# Patient Record
Sex: FEMALE | Race: WHITE
[De-identification: names, ages, dates, MRNs, and addresses within clinical notes are randomized per-mention and may not be internally consistent; named-entity substitution may affect disease eponyms.]

---

## 2018-07-11 ENCOUNTER — HOSPITAL ENCOUNTER (OUTPATIENT)
Age: 23
Discharge: HOME | End: 2018-07-11

## 2018-07-11 ENCOUNTER — HOSPITAL ENCOUNTER (OUTPATIENT)
Dept: HOSPITAL 91 - OBT | Age: 23
Discharge: HOME | End: 2018-07-11
Payer: COMMERCIAL

## 2018-07-11 DIAGNOSIS — Z3A.28: ICD-10-CM

## 2018-07-11 LAB
ADD UMIC: NO
FETAL FIBRONECTIN: NEGATIVE
UR ASCORBIC ACID: NEGATIVE MG/DL
UR BILIRUBIN (DIP): NEGATIVE MG/DL
UR BLOOD (DIP): NEGATIVE MG/DL
UR CLARITY: CLEAR
UR COLOR: YELLOW
UR GLUCOSE (DIP): NEGATIVE MG/DL
UR KETONES (DIP): NEGATIVE MG/DL
UR LEUKOCYTE ESTERASE (DIP): NEGATIVE LEU/UL
UR NITRITE (DIP): NEGATIVE MG/DL
UR PH (DIP): 6 (ref 5–9)
UR SPECIFIC GRAVITY (DIP): 1.02 (ref 1–1.03)
UR TOTAL PROTEIN (DIP): NEGATIVE MG/DL
UR UROBILINOGEN (DIP): NEGATIVE MG/DL

## 2018-07-11 PROCEDURE — 81003 URINALYSIS AUTO W/O SCOPE: CPT

## 2018-07-11 PROCEDURE — 82731 ASSAY OF FETAL FIBRONECTIN: CPT

## 2018-07-11 PROCEDURE — 76817 TRANSVAGINAL US OBSTETRIC: CPT

## 2018-09-18 ENCOUNTER — HOSPITAL ENCOUNTER (OUTPATIENT)
Dept: HOSPITAL 91 - OBT | Age: 23
Discharge: HOME | End: 2018-09-18
Payer: COMMERCIAL

## 2018-09-18 ENCOUNTER — HOSPITAL ENCOUNTER (OUTPATIENT)
Age: 23
Discharge: HOME | End: 2018-09-18

## 2018-09-18 DIAGNOSIS — Z3A.38: ICD-10-CM

## 2018-09-18 DIAGNOSIS — O36.8130: Primary | ICD-10-CM

## 2018-09-18 PROCEDURE — 76818 FETAL BIOPHYS PROFILE W/NST: CPT

## 2018-09-18 PROCEDURE — 76815 OB US LIMITED FETUS(S): CPT

## 2018-09-27 ENCOUNTER — HOSPITAL ENCOUNTER (INPATIENT)
Age: 23
LOS: 3 days | Discharge: HOME | End: 2018-09-30

## 2018-09-27 ENCOUNTER — HOSPITAL ENCOUNTER (INPATIENT)
Dept: HOSPITAL 91 - L-D | Age: 23
LOS: 3 days | Discharge: HOME | End: 2018-09-30
Payer: COMMERCIAL

## 2018-09-27 DIAGNOSIS — O34.211: Primary | ICD-10-CM

## 2018-09-27 DIAGNOSIS — Z3A.39: ICD-10-CM

## 2018-09-27 LAB
ADD MAN DIFF?: NO
BASOPHIL #: 0.1 10^3/UL (ref 0–0.1)
BASOPHILS %: 0.5 % (ref 0–2)
EOSINOPHILS #: 0.1 10^3/UL (ref 0–0.5)
EOSINOPHILS %: 0.6 % (ref 0–7)
HEMATOCRIT: 37 % (ref 37–47)
HEMOGLOBIN: 12.5 G/DL (ref 12–16)
IMMATURE GRANS #M: 0.06 10^3/UL (ref 0–0.03)
IMMATURE GRANS % (M): 0.6 % (ref 0–0.43)
INR: 0.89
LYMPHOCYTES #: 2.8 10^3/UL (ref 0.8–2.9)
LYMPHOCYTES %: 30.1 % (ref 15–51)
MEAN CORPUSCULAR HEMOGLOBIN: 30.6 PG (ref 29–33)
MEAN CORPUSCULAR HGB CONC: 33.8 G/DL (ref 32–37)
MEAN CORPUSCULAR VOLUME: 90.5 FL (ref 82–101)
MEAN PLATELET VOLUME: 10.9 FL (ref 7.4–10.4)
MONOCYTE #: 0.4 10^3/UL (ref 0.3–0.9)
MONOCYTES %: 4.6 % (ref 0–11)
NEUTROPHIL #: 6 10^3/UL (ref 1.6–7.5)
NEUTROPHILS %: 63.6 % (ref 39–77)
NUCLEATED RED BLOOD CELLS #: 0 10^3/UL (ref 0–0)
NUCLEATED RED BLOOD CELLS%: 0 /100WBC (ref 0–0)
PARTIAL THROMBOPLASTIN TIME: 24.1 SEC (ref 23–35)
PLATELET COUNT: 232 10^3/UL (ref 140–415)
PROTIME: 12.1 SEC (ref 11.9–14.9)
PT RATIO: 0.9
RAPID PLASMA REAGIN: NONREACTIVE
RED BLOOD COUNT: 4.09 10^6/UL (ref 4.2–5.4)
RED CELL DISTRIBUTION WIDTH: 12.4 % (ref 11.5–14.5)
WHITE BLOOD COUNT: 9.4 10^3/UL (ref 4.8–10.8)

## 2018-09-27 PROCEDURE — 86900 BLOOD TYPING SEROLOGIC ABO: CPT

## 2018-09-27 PROCEDURE — 85610 PROTHROMBIN TIME: CPT

## 2018-09-27 PROCEDURE — 86850 RBC ANTIBODY SCREEN: CPT

## 2018-09-27 PROCEDURE — 86901 BLOOD TYPING SEROLOGIC RH(D): CPT

## 2018-09-27 PROCEDURE — 90715 TDAP VACCINE 7 YRS/> IM: CPT

## 2018-09-27 PROCEDURE — 80048 BASIC METABOLIC PNL TOTAL CA: CPT

## 2018-09-27 PROCEDURE — 86592 SYPHILIS TEST NON-TREP QUAL: CPT

## 2018-09-27 PROCEDURE — 85025 COMPLETE CBC W/AUTO DIFF WBC: CPT

## 2018-09-27 PROCEDURE — 85730 THROMBOPLASTIN TIME PARTIAL: CPT

## 2018-09-27 RX ADMIN — PYRIDOXINE HYDROCHLORIDE 1 MLS/HR: 100 INJECTION, SOLUTION INTRAMUSCULAR; INTRAVENOUS at 10:20

## 2018-09-27 RX ADMIN — SODIUM CITRATE AND CITRIC ACID MONOHYDRATE 1 ML: 500; 334 SOLUTION ORAL at 17:31

## 2018-09-27 RX ADMIN — ONDANSETRON HYDROCHLORIDE 1 MG: 2 INJECTION, SOLUTION INTRAMUSCULAR; INTRAVENOUS at 17:31

## 2018-09-27 RX ADMIN — AMPICILLIN 1 MLS/HR: 1 INJECTION, POWDER, FOR SOLUTION INTRAMUSCULAR; INTRAVENOUS at 17:30

## 2018-09-27 RX ADMIN — AMPICILLIN 1 MLS/HR: 1 INJECTION, POWDER, FOR SOLUTION INTRAMUSCULAR; INTRAVENOUS at 21:30

## 2018-09-27 RX ADMIN — AMPICILLIN 1 MLS/HR: 2 INJECTION, POWDER, FOR SOLUTION INTRAVENOUS at 10:20

## 2018-09-27 RX ADMIN — Medication 1 MLS/HR: at 19:16

## 2018-09-27 RX ADMIN — DOCUSATE SODIUM AND SENNOSIDES 1 TAB: 8.6; 5 TABLET, FILM COATED ORAL at 21:00

## 2018-09-27 RX ADMIN — AMPICILLIN 1 MLS/HR: 1 INJECTION, POWDER, FOR SOLUTION INTRAMUSCULAR; INTRAVENOUS at 14:56

## 2018-09-27 RX ADMIN — PYRIDOXINE HYDROCHLORIDE 1 MLS/HR: 100 INJECTION, SOLUTION INTRAMUSCULAR; INTRAVENOUS at 17:05

## 2018-09-27 RX ADMIN — Medication 1 MLS/HR: at 23:15

## 2018-09-28 LAB
ADD MAN DIFF?: NO
ANION GAP: 8 (ref 8–16)
BASOPHIL #: 0 10^3/UL (ref 0–0.1)
BASOPHILS %: 0.3 % (ref 0–2)
BLOOD UREA NITROGEN: 6 MG/DL (ref 7–20)
CALCIUM: 8.5 MG/DL (ref 8.4–10.2)
CARBON DIOXIDE: 24 MMOL/L (ref 21–31)
CHLORIDE: 105 MMOL/L (ref 97–110)
CREATININE: 0.48 MG/DL (ref 0.44–1)
EOSINOPHILS #: 0 10^3/UL (ref 0–0.5)
EOSINOPHILS %: 0.2 % (ref 0–7)
GLUCOSE: 92 MG/DL (ref 70–220)
HEMATOCRIT: 31.4 % (ref 37–47)
HEMOGLOBIN: 10.5 G/DL (ref 12–16)
IMMATURE GRANS #M: 0.07 10^3/UL (ref 0–0.03)
IMMATURE GRANS % (M): 0.5 % (ref 0–0.43)
LYMPHOCYTES #: 2.9 10^3/UL (ref 0.8–2.9)
LYMPHOCYTES %: 19.9 % (ref 15–51)
MEAN CORPUSCULAR HEMOGLOBIN: 30.6 PG (ref 29–33)
MEAN CORPUSCULAR HGB CONC: 33.4 G/DL (ref 32–37)
MEAN CORPUSCULAR VOLUME: 91.5 FL (ref 82–101)
MEAN PLATELET VOLUME: 10.8 FL (ref 7.4–10.4)
MONOCYTE #: 0.7 10^3/UL (ref 0.3–0.9)
MONOCYTES %: 4.7 % (ref 0–11)
NEUTROPHIL #: 10.7 10^3/UL (ref 1.6–7.5)
NEUTROPHILS %: 74.4 % (ref 39–77)
NUCLEATED RED BLOOD CELLS #: 0 10^3/UL (ref 0–0)
NUCLEATED RED BLOOD CELLS%: 0 /100WBC (ref 0–0)
PLATELET COUNT: 188 10^3/UL (ref 140–415)
POTASSIUM: 4 MMOL/L (ref 3.5–5.1)
RED BLOOD COUNT: 3.43 10^6/UL (ref 4.2–5.4)
RED CELL DISTRIBUTION WIDTH: 12.4 % (ref 11.5–14.5)
SODIUM: 133 MMOL/L (ref 135–144)
WHITE BLOOD COUNT: 14.4 10^3/UL (ref 4.8–10.8)

## 2018-09-28 RX ADMIN — KETOROLAC TROMETHAMINE 1 MG: 30 INJECTION, SOLUTION INTRAMUSCULAR at 01:50

## 2018-09-28 RX ADMIN — KETOROLAC TROMETHAMINE 1 MG: 30 INJECTION, SOLUTION INTRAMUSCULAR at 07:50

## 2018-09-28 RX ADMIN — PYRIDOXINE HYDROCHLORIDE 1 MLS/HR: 100 INJECTION, SOLUTION INTRAMUSCULAR; INTRAVENOUS at 10:38

## 2018-09-28 RX ADMIN — AMPICILLIN 1 MLS/HR: 1 INJECTION, POWDER, FOR SOLUTION INTRAMUSCULAR; INTRAVENOUS at 05:30

## 2018-09-28 RX ADMIN — Medication 1 APPLIC: at 06:03

## 2018-09-28 RX ADMIN — Medication 1 MG: at 21:00

## 2018-09-28 RX ADMIN — AMPICILLIN 1 MLS/HR: 1 INJECTION, POWDER, FOR SOLUTION INTRAMUSCULAR; INTRAVENOUS at 01:30

## 2018-09-28 RX ADMIN — DOCUSATE SODIUM AND SENNOSIDES 1 TAB: 8.6; 5 TABLET, FILM COATED ORAL at 09:00

## 2018-09-28 RX ADMIN — KETOROLAC TROMETHAMINE 1 MG: 30 INJECTION, SOLUTION INTRAMUSCULAR at 13:33

## 2018-09-28 RX ADMIN — MAGNESIUM HYDROXIDE 1 ML: 400 SUSPENSION ORAL at 21:12

## 2018-09-28 RX ADMIN — IBUPROFEN 1 MG: 800 TABLET, FILM COATED ORAL at 19:36

## 2018-09-28 RX ADMIN — AMPICILLIN 1 MLS/HR: 1 INJECTION, POWDER, FOR SOLUTION INTRAMUSCULAR; INTRAVENOUS at 21:30

## 2018-09-28 RX ADMIN — DOCUSATE SODIUM AND SENNOSIDES 1 TAB: 8.6; 5 TABLET, FILM COATED ORAL at 21:12

## 2018-09-28 RX ADMIN — PYRIDOXINE HYDROCHLORIDE 1 MLS/HR: 100 INJECTION, SOLUTION INTRAMUSCULAR; INTRAVENOUS at 19:30

## 2018-09-29 RX ADMIN — IBUPROFEN 1 MG: 800 TABLET, FILM COATED ORAL at 21:13

## 2018-09-29 RX ADMIN — PYRIDOXINE HYDROCHLORIDE 1 MLS/HR: 100 INJECTION, SOLUTION INTRAMUSCULAR; INTRAVENOUS at 02:59

## 2018-09-29 RX ADMIN — DOCUSATE SODIUM AND SENNOSIDES 1 TAB: 8.6; 5 TABLET, FILM COATED ORAL at 21:13

## 2018-09-29 RX ADMIN — IBUPROFEN 1 MG: 800 TABLET, FILM COATED ORAL at 13:03

## 2018-09-29 RX ADMIN — MAGNESIUM HYDROXIDE 1 ML: 400 SUSPENSION ORAL at 09:51

## 2018-09-29 RX ADMIN — AMPICILLIN 1 MLS/HR: 1 INJECTION, POWDER, FOR SOLUTION INTRAMUSCULAR; INTRAVENOUS at 01:30

## 2018-09-29 RX ADMIN — Medication 1 MG: at 09:52

## 2018-09-29 RX ADMIN — IBUPROFEN 1 MG: 800 TABLET, FILM COATED ORAL at 04:27

## 2018-09-29 RX ADMIN — DOCUSATE SODIUM AND SENNOSIDES 1 TAB: 8.6; 5 TABLET, FILM COATED ORAL at 09:51

## 2018-09-29 RX ADMIN — AMPICILLIN 1 MLS/HR: 1 INJECTION, POWDER, FOR SOLUTION INTRAMUSCULAR; INTRAVENOUS at 05:30

## 2018-09-30 LAB
ADD MAN DIFF?: NO
BASOPHIL #: 0 10^3/UL (ref 0–0.1)
BASOPHILS %: 0.3 % (ref 0–2)
EOSINOPHILS #: 0.1 10^3/UL (ref 0–0.5)
EOSINOPHILS %: 0.7 % (ref 0–7)
HEMATOCRIT: 32.4 % (ref 37–47)
HEMOGLOBIN: 10.5 G/DL (ref 12–16)
IMMATURE GRANS #M: 0.07 10^3/UL (ref 0–0.03)
IMMATURE GRANS % (M): 0.6 % (ref 0–0.43)
LYMPHOCYTES #: 3.8 10^3/UL (ref 0.8–2.9)
LYMPHOCYTES %: 32 % (ref 15–51)
MEAN CORPUSCULAR HEMOGLOBIN: 29.9 PG (ref 29–33)
MEAN CORPUSCULAR HGB CONC: 32.4 G/DL (ref 32–37)
MEAN CORPUSCULAR VOLUME: 92.3 FL (ref 82–101)
MEAN PLATELET VOLUME: 10.4 FL (ref 7.4–10.4)
MONOCYTE #: 0.7 10^3/UL (ref 0.3–0.9)
MONOCYTES %: 5.9 % (ref 0–11)
NEUTROPHIL #: 7.2 10^3/UL (ref 1.6–7.5)
NEUTROPHILS %: 60.5 % (ref 39–77)
NUCLEATED RED BLOOD CELLS #: 0 10^3/UL (ref 0–0)
NUCLEATED RED BLOOD CELLS%: 0 /100WBC (ref 0–0)
PLATELET COUNT: 212 10^3/UL (ref 140–415)
RED BLOOD COUNT: 3.51 10^6/UL (ref 4.2–5.4)
RED CELL DISTRIBUTION WIDTH: 12.7 % (ref 11.5–14.5)
WHITE BLOOD COUNT: 11.9 10^3/UL (ref 4.8–10.8)

## 2018-09-30 RX ADMIN — DOCUSATE SODIUM AND SENNOSIDES 1 TAB: 8.6; 5 TABLET, FILM COATED ORAL at 09:07

## 2018-09-30 RX ADMIN — CLOSTRIDIUM TETANI TOXOID ANTIGEN (FORMALDEHYDE INACTIVATED), CORYNEBACTERIUM DIPHTHERIAE TOXOID ANTIGEN (FORMALDEHYDE INACTIVATED), BORDETELLA PERTUSSIS TOXOID ANTIGEN (GLUTARALDEHYDE INACTIVATED), BORDETELLA PERTUSSIS FILAMENTOUS HEMAGGLUTININ ANTIGEN (FORMALDEHYDE INACTIVATED), BORDETELLA PERTUSSIS PERTACTIN ANTIGEN, AND BORDETELLA PERTUSSIS FIMBRIAE 2/3 ANTIGEN 1 ML: 5; 2; 2.5; 5; 3; 5 INJECTION, SUSPENSION INTRAMUSCULAR at 11:11

## 2018-09-30 RX ADMIN — IBUPROFEN 1 MG: 800 TABLET, FILM COATED ORAL at 12:47

## 2018-09-30 RX ADMIN — IBUPROFEN 1 MG: 800 TABLET, FILM COATED ORAL at 05:13

## 2019-06-11 ENCOUNTER — HOSPITAL ENCOUNTER (INPATIENT)
Dept: HOSPITAL 10 - OBT | Age: 24
LOS: 6 days | Discharge: HOME | DRG: 833 | End: 2019-06-17
Attending: OBSTETRICS & GYNECOLOGY | Admitting: OBSTETRICS & GYNECOLOGY
Payer: COMMERCIAL

## 2019-06-11 ENCOUNTER — HOSPITAL ENCOUNTER (INPATIENT)
Dept: HOSPITAL 91 - OBT | Age: 24
LOS: 6 days | Discharge: HOME | DRG: 833 | End: 2019-06-17
Payer: COMMERCIAL

## 2019-06-11 VITALS
HEIGHT: 60 IN | WEIGHT: 185.19 LBS | HEIGHT: 60 IN | BODY MASS INDEX: 36.36 KG/M2 | WEIGHT: 185.19 LBS | BODY MASS INDEX: 36.36 KG/M2

## 2019-06-11 VITALS — RESPIRATION RATE: 17 BRPM | HEART RATE: 110 BPM | SYSTOLIC BLOOD PRESSURE: 112 MMHG | DIASTOLIC BLOOD PRESSURE: 68 MMHG

## 2019-06-11 DIAGNOSIS — O23.02: Primary | ICD-10-CM

## 2019-06-11 DIAGNOSIS — O99.212: ICD-10-CM

## 2019-06-11 DIAGNOSIS — Z3A.24: ICD-10-CM

## 2019-06-11 LAB
ADD MAN DIFF?: NO
ADD UMIC: YES
ALANINE AMINOTRANSFERASE: 20 IU/L (ref 13–69)
ALBUMIN/GLOBULIN RATIO: 1.15
ALBUMIN: 3.8 G/DL (ref 3.3–4.9)
ALKALINE PHOSPHATASE: 83 IU/L (ref 42–121)
ANION GAP: 10 (ref 5–13)
ASPARTATE AMINO TRANSFERASE: 26 IU/L (ref 15–46)
BASOPHIL #: 0 10^3/UL (ref 0–0.1)
BASOPHILS %: 0.3 % (ref 0–2)
BILIRUBIN,DIRECT: 0 MG/DL (ref 0–0.2)
BILIRUBIN,TOTAL: 0.5 MG/DL (ref 0.2–1.3)
BLOOD UREA NITROGEN: 4 MG/DL (ref 7–20)
CALCIUM: 9.3 MG/DL (ref 8.4–10.2)
CARBON DIOXIDE: 20 MMOL/L (ref 21–31)
CHLORIDE: 106 MMOL/L (ref 97–110)
CREATININE: 0.5 MG/DL (ref 0.44–1)
EOSINOPHILS #: 0 10^3/UL (ref 0–0.5)
EOSINOPHILS %: 0.1 % (ref 0–7)
GLOBULIN: 3.3 G/DL (ref 1.3–3.2)
GLUCOSE: 94 MG/DL (ref 70–220)
HEMATOCRIT: 32.7 % (ref 37–47)
HEMOGLOBIN: 10.9 G/DL (ref 12–16)
IMMATURE GRANS #M: 0.07 10^3/UL (ref 0–0.03)
IMMATURE GRANS % (M): 0.6 % (ref 0–0.43)
LYMPHOCYTES #: 1.5 10^3/UL (ref 0.8–2.9)
LYMPHOCYTES %: 12.4 % (ref 15–51)
MEAN CORPUSCULAR HEMOGLOBIN: 31.7 PG (ref 29–33)
MEAN CORPUSCULAR HGB CONC: 33.3 G/DL (ref 32–37)
MEAN CORPUSCULAR VOLUME: 95.1 FL (ref 82–101)
MEAN PLATELET VOLUME: 10.1 FL (ref 7.4–10.4)
MONOCYTE #: 0.6 10^3/UL (ref 0.3–0.9)
MONOCYTES %: 5 % (ref 0–11)
NEUTROPHIL #: 9.6 10^3/UL (ref 1.6–7.5)
NEUTROPHILS %: 81.6 % (ref 39–77)
NUCLEATED RED BLOOD CELLS #: 0 10^3/UL (ref 0–0)
NUCLEATED RED BLOOD CELLS%: 0 /100WBC (ref 0–0)
PLATELET COUNT: 267 10^3/UL (ref 140–415)
POTASSIUM: 3.4 MMOL/L (ref 3.5–5.1)
RED BLOOD COUNT: 3.44 10^6/UL (ref 4.2–5.4)
RED CELL DISTRIBUTION WIDTH: 12.1 % (ref 11.5–14.5)
SODIUM: 136 MMOL/L (ref 135–144)
TOTAL PROTEIN: 7.1 G/DL (ref 6.1–8.1)
UR ASCORBIC ACID: NEGATIVE MG/DL
UR BILIRUBIN (DIP): NEGATIVE MG/DL
UR BLOOD (DIP): NEGATIVE MG/DL
UR CLARITY: (no result)
UR COLOR: (no result)
UR GLUCOSE (DIP): NEGATIVE MG/DL
UR KETONES (DIP): (no result) MG/DL
UR LEUKOCYTE ESTERASE (DIP): (no result) LEU/UL
UR MUCUS: (no result) /HPF
UR NITRITE (DIP): NEGATIVE MG/DL
UR PH (DIP): 6 (ref 5–9)
UR RBC: 2 /HPF (ref 0–5)
UR SPECIFIC GRAVITY (DIP): 1.02 (ref 1–1.03)
UR SQUAMOUS EPITHELIAL CELL: (no result) /HPF
UR TOTAL PROTEIN (DIP): NEGATIVE MG/DL
UR UROBILINOGEN (DIP): NEGATIVE MG/DL
UR WBC: 9 /HPF (ref 0–5)
WHITE BLOOD COUNT: 11.8 10^3/UL (ref 4.8–10.8)

## 2019-06-11 PROCEDURE — 87400 INFLUENZA A/B EACH AG IA: CPT

## 2019-06-11 PROCEDURE — 87086 URINE CULTURE/COLONY COUNT: CPT

## 2019-06-11 PROCEDURE — 85025 COMPLETE CBC W/AUTO DIFF WBC: CPT

## 2019-06-11 PROCEDURE — 81003 URINALYSIS AUTO W/O SCOPE: CPT

## 2019-06-11 PROCEDURE — 76817 TRANSVAGINAL US OBSTETRIC: CPT

## 2019-06-11 PROCEDURE — 87040 BLOOD CULTURE FOR BACTERIA: CPT

## 2019-06-11 PROCEDURE — 81001 URINALYSIS AUTO W/SCOPE: CPT

## 2019-06-11 PROCEDURE — 76815 OB US LIMITED FETUS(S): CPT

## 2019-06-11 PROCEDURE — 76775 US EXAM ABDO BACK WALL LIM: CPT

## 2019-06-11 PROCEDURE — 80053 COMPREHEN METABOLIC PANEL: CPT

## 2019-06-11 PROCEDURE — G0463 HOSPITAL OUTPT CLINIC VISIT: HCPCS

## 2019-06-11 PROCEDURE — 76818 FETAL BIOPHYS PROFILE W/NST: CPT

## 2019-06-11 RX ADMIN — PYRIDOXINE HYDROCHLORIDE 1 MLS/HR: 100 INJECTION, SOLUTION INTRAMUSCULAR; INTRAVENOUS at 23:59

## 2019-06-11 RX ADMIN — PYRIDOXINE HYDROCHLORIDE 1 MLS/HR: 100 INJECTION, SOLUTION INTRAMUSCULAR; INTRAVENOUS at 23:38

## 2019-06-11 RX ADMIN — ACETAMINOPHEN 1 MG: 325 TABLET, FILM COATED ORAL at 22:10

## 2019-06-11 RX ADMIN — PYRIDOXINE HYDROCHLORIDE SCH MLS/HR: 100 INJECTION, SOLUTION INTRAMUSCULAR; INTRAVENOUS at 23:59

## 2019-06-12 RX ADMIN — OSELTAMIVIR PHOSPHATE 1 MG: 75 CAPSULE ORAL at 02:44

## 2019-06-12 RX ADMIN — PYRIDOXINE HYDROCHLORIDE 1 MLS/HR: 100 INJECTION, SOLUTION INTRAMUSCULAR; INTRAVENOUS at 20:40

## 2019-06-12 RX ADMIN — OSELTAMIVIR PHOSPHATE 1 MG: 75 CAPSULE ORAL at 11:14

## 2019-06-12 RX ADMIN — OSELTAMIVIR PHOSPHATE SCH MG: 75 CAPSULE ORAL at 02:44

## 2019-06-12 RX ADMIN — CEFTRIAXONE SCH MLS/HR: 1 INJECTION, SOLUTION INTRAVENOUS at 01:37

## 2019-06-12 RX ADMIN — PYRIDOXINE HYDROCHLORIDE 1 MLS/HR: 100 INJECTION, SOLUTION INTRAMUSCULAR; INTRAVENOUS at 23:59

## 2019-06-12 RX ADMIN — FOLIC ACID SCH MLS/HR: 5 INJECTION, SOLUTION INTRAMUSCULAR; INTRAVENOUS; SUBCUTANEOUS at 01:37

## 2019-06-12 RX ADMIN — THIAMINE HYDROCHLORIDE 1 MLS/HR: 100 INJECTION, SOLUTION INTRAMUSCULAR; INTRAVENOUS at 16:30

## 2019-06-12 RX ADMIN — FOLIC ACID SCH MLS/HR: 5 INJECTION, SOLUTION INTRAMUSCULAR; INTRAVENOUS; SUBCUTANEOUS at 16:30

## 2019-06-12 RX ADMIN — ACETAMINOPHEN 1 MG: 325 TABLET, FILM COATED ORAL at 23:21

## 2019-06-12 RX ADMIN — ACETAMINOPHEN 1 MG: 325 TABLET, FILM COATED ORAL at 04:30

## 2019-06-12 RX ADMIN — FOLIC ACID SCH MLS/HR: 5 INJECTION, SOLUTION INTRAMUSCULAR; INTRAVENOUS; SUBCUTANEOUS at 08:30

## 2019-06-12 RX ADMIN — PYRIDOXINE HYDROCHLORIDE SCH MLS/HR: 100 INJECTION, SOLUTION INTRAMUSCULAR; INTRAVENOUS at 23:59

## 2019-06-12 RX ADMIN — OSELTAMIVIR PHOSPHATE 1 MG: 75 CAPSULE ORAL at 21:10

## 2019-06-12 RX ADMIN — OSELTAMIVIR PHOSPHATE SCH MG: 75 CAPSULE ORAL at 11:14

## 2019-06-12 RX ADMIN — CEFTRIAXONE 1 MLS/HR: 1 INJECTION, SOLUTION INTRAVENOUS at 01:37

## 2019-06-12 RX ADMIN — THIAMINE HYDROCHLORIDE 1 MLS/HR: 100 INJECTION, SOLUTION INTRAMUSCULAR; INTRAVENOUS at 01:37

## 2019-06-12 RX ADMIN — ACETAMINOPHEN 1 MG: 325 TABLET, FILM COATED ORAL at 09:44

## 2019-06-12 RX ADMIN — Medication SCH TAB: at 09:38

## 2019-06-12 RX ADMIN — PYRIDOXINE HYDROCHLORIDE SCH MLS/HR: 100 INJECTION, SOLUTION INTRAMUSCULAR; INTRAVENOUS at 20:40

## 2019-06-12 RX ADMIN — THIAMINE HYDROCHLORIDE 1 MLS/HR: 100 INJECTION, SOLUTION INTRAMUSCULAR; INTRAVENOUS at 08:30

## 2019-06-12 RX ADMIN — OSELTAMIVIR PHOSPHATE SCH MG: 75 CAPSULE ORAL at 21:10

## 2019-06-12 RX ADMIN — ACETAMINOPHEN 1 MG: 325 TABLET, FILM COATED ORAL at 17:36

## 2019-06-12 RX ADMIN — Medication 1 TAB: at 09:38

## 2019-06-12 NOTE — HP
Date/Time of Note


Date/Time of Note


DATE: 19 


TIME: 01:00





OB - History


Hx of Present Pregnancy


Free Text/Dictation


Patient is a 23-year-old  3 para 2 at 24 weeks of gestation with 


estimated date of delivery 2019


Patient just arrived from [facility] and immediately came here to the hospital 


directly from the airport


She reports of having urinary tract infection in [facility] which she was rece


iving IV antibiotic treatments daily


Patient also reports of possible mosquito bites while in [facility]


She is complaining of fevers, total body aches and flulike symptoms and back 


pain





Patient reports positive fetal movement, denies vaginal bleeding and leaking 


fluid, denies uterine contractions


Estimated Due Date:  Oct 1, 2019


:  3


Para:  2


Prenatal Care:  Limited Care





Past Family/Social History


*


Past medical and past surgical history are noncontributory to this admission





OB  Admission Exam


Vital Signs


Vital Signs





Vital Signs








                          VS - Last 72 Hours, by Label


  Date      Temp   Pulse  Resp  B/P (MAP)   Pulse Ox  O2         O2 Flow    FiO2


Time                                                  Delivery   Rate


   19   99.4


     05:15


   19  100.4


     04:30


   19   98.7


     02:00


   19   98.7


     01:35


   19  101.3


     22:50


   19  100.3


     22:10


   19  100.3    110    17      112/68            Room Air


     21:26                            (83)








Physical Exam


HEENT:  WNL


Heart:  Rhythm Normal


Lungs:  Clear, Equal


Abdomen:  WNL


Extremities:  Normal


Reflexes:  Normal


Membranes:  Intact


Fetal Heart Rate:  140's


Accelerations:  Accelerations Present


Decelerations:  No Decelerations


Contractions on Admission:  None


Last 72 hours Lab Results


                                    CBC & BMP


19 22:07











                                 Liver Function


               Test
                                19
22:07


               Alanine Aminotransferase
(ALT/SGPT)          20  



               Albumin                                      3.8


               Alkaline Phosphatase                          83


               Aspartate Amino Transf
(AST/SGOT)            26  



               Direct Bilirubin                            0.00


               Total Protein                                7.1








                                        


PROCEDURE:   US OB. 


 


CLINICAL INDICATION:     labor


 


TECHNIQUE:   Transabdominal views of the pelvis are available for review. 


 


COMPARISON:   Obstetric ultrasound 2018 


 


FINDINGS:


 


Noted is a single intrauterine gestation in breech lie with positive fetal heart


beat measuring 132 beats per minute. The biparietal diameter measures 6 cm 


corresponding to a gestational age 24 weeks 3 days. Femur length measures 4.4 cm


corresponding to a gestational age 24 weeks 2 days. Abdominal circumference 


measures 20 cm corresponding to a gestational age 24 weeks 4 days. FL/AC ratio i


s 21.9 which is within normal limits. Estimated fetal weight is 703 g. Estimated


date of delivery by ultrasound criteria is 2019. No gross fetal 


anomaly is seen, however, anatomic survey was not performed. The amniotic fluid 


index was not calculated. Estimated date of delivery is 2019. The 


placenta is posterior fundal grade 1. There is no evidence of previa. Maternal 


cervix is closed measuring 4.6 cm in length.


 


IMPRESSION:


 


Single intrauterine gestation in breech lie with positive fetal heart beat of 


calculated gestational age 24 weeks 3 days. No gross anomaly. Fluid 


qualitatively within normal limits. Placenta posterior fundal grade 1.


_____________________________________________ 


.Esequiel Lockwood MD, MD           Date    Time 


Electronically viewed and signed by .Esequiel Lockwood MD, MD on 2019 


22:20 


 


D:  2019 22:20  T:  2019 22:20


.A/





CC: BRIE HOOKS MD





[id number]











                                        


PROCEDURE:   US OB biophysical profile. 


 


CLINICAL INDICATION:   Labor


 


TECHNIQUE:   Multiple sonographic images of the pelvis were obtained.  The 


images were reviewed on a PACS workstation. 


 


COMPARISON:   None 


 


FINDINGS:


 


Noted is a single intrauterine gestation in breech lie with positive fetal heart


beat measuring 139 beats per minute. The amniotic fluid index was not measured, 


however, the volume is qualitatively normal with a maximum pocket measuring 8 cm


in diameter.


 


Biophysical profile:


Fetal movement 2/2


Fetal tone 2/2.


Fetal breathing 2/2 


VINCENZO 2/2


 


Total  


 


IMPRESSION:


Normal biophysical profile . 


 


 .


_____________________________________________ 


.Esequiel oLckwood MD, MD           Date    Time 


Electronically viewed and signed by .Esequiel Lockwood MD, MD on 2019 


22:30 


 


D:  2019 22:30  T:  2019 22:30


.A/





CC: BRIE HOOKS MD





[id number]











                                        


PROCEDURE:   Renal US. 


 


CLINICAL INDICATION:   Possible pyelonephritis, pain


 


TECHNIQUE:   Multiple sonographic images of the kidneys and bladder were 


obtained.  The images were reviewed on a PACS workstation. 


 


COMPARISON:   No prior studies are available for comparison. 


 


FINDINGS:


 


The right kidney measures 10.4 cm and the left kidney 11.2 cm in length. No 


renal mass, calculus or hydronephrosis is seen bilaterally. No abnormality of 


the bladder is seen.


 


IMPRESSION:


 


No abnormality seen.  


 


RPTAT: HJES


_____________________________________________ 


.Keenan Almanza MD, MD           Date    Time 


Electronically viewed and signed by .Keenan Almanza MD, MD on 2019 02:57 


 


D:  2019 02:57  T:  2019 02:57


.S/





CC: BRIE HOOKS MD





[id number]














                                                       


                                    *** Microbiology ***                        


           


                                                                                


          


  INFLUENZA A & B BY EIA  Final  


        INFLU A&B BY EIA            INFLUENZA A NEGATIVE (Ref Range Neg)


                                    INFLUENZA B NEGATIVE (Ref Range Neg)





OB  Assessment/Plan


Reason for admission:  other (Suspected pyelonephritis)


Other plan:


Admit to antepartum


Urine culture and blood cultures x2 done


IV antibiotics


Infectious disease consult for possible Zika exposure


Influenza A and B negative





Copies To:


CC:   BRODIE STEWARD MD ;











BRIE HOOKS MD             2019 01:05

## 2019-06-12 NOTE — QN
Documentation


Comment


progress note 


patient seen and evaluated


no complaints 


vs tmax 100.4


ab gravid, positive left cva tenderness


extremity no edema no calf tenderness





a/ iup at 24 wks ga, pyelonephritis currently on iv antibiotics





p/ f/u cultures


continue present management











BRODIE STEWARD MD           Jun 12, 2019 18:42

## 2019-06-13 RX ADMIN — OSELTAMIVIR PHOSPHATE 1 MG: 75 CAPSULE ORAL at 21:09

## 2019-06-13 RX ADMIN — OSELTAMIVIR PHOSPHATE SCH MG: 75 CAPSULE ORAL at 21:09

## 2019-06-13 RX ADMIN — CEFTRIAXONE 1 MLS/HR: 1 INJECTION, SOLUTION INTRAVENOUS at 01:04

## 2019-06-13 RX ADMIN — CEFTRIAXONE SCH MLS/HR: 1 INJECTION, SOLUTION INTRAVENOUS at 01:04

## 2019-06-13 RX ADMIN — Medication 1 TAB: at 09:22

## 2019-06-13 RX ADMIN — FOLIC ACID SCH MLS/HR: 5 INJECTION, SOLUTION INTRAMUSCULAR; INTRAVENOUS; SUBCUTANEOUS at 19:30

## 2019-06-13 RX ADMIN — FOLIC ACID SCH MLS/HR: 5 INJECTION, SOLUTION INTRAMUSCULAR; INTRAVENOUS; SUBCUTANEOUS at 01:53

## 2019-06-13 RX ADMIN — Medication SCH TAB: at 09:22

## 2019-06-13 RX ADMIN — THIAMINE HYDROCHLORIDE 1 MLS/HR: 100 INJECTION, SOLUTION INTRAMUSCULAR; INTRAVENOUS at 19:30

## 2019-06-13 RX ADMIN — THIAMINE HYDROCHLORIDE 1 MLS/HR: 100 INJECTION, SOLUTION INTRAMUSCULAR; INTRAVENOUS at 01:53

## 2019-06-13 RX ADMIN — THIAMINE HYDROCHLORIDE 1 MLS/HR: 100 INJECTION, SOLUTION INTRAMUSCULAR; INTRAVENOUS at 10:50

## 2019-06-13 RX ADMIN — FOLIC ACID SCH MLS/HR: 5 INJECTION, SOLUTION INTRAMUSCULAR; INTRAVENOUS; SUBCUTANEOUS at 10:50

## 2019-06-13 RX ADMIN — OSELTAMIVIR PHOSPHATE SCH MG: 75 CAPSULE ORAL at 09:20

## 2019-06-13 RX ADMIN — OSELTAMIVIR PHOSPHATE 1 MG: 75 CAPSULE ORAL at 09:20

## 2019-06-13 NOTE — QN
Documentation


Comment


progress note 


patient seen and evaluated


no complaints 


vs tmax 100.5


ab gravid, mild left cva tenderness


extremity no edema no calf tenderness





a/ iup at 24 wks ga, pyelonephritis currently on iv antibiotics





p/ f/u cultures


f/u infectious disease specialist


continue present management











BRODIE STEWARD MD           Jun 13, 2019 17:43

## 2019-06-13 NOTE — CONS
DATE OF ADMISSION: 2019

DATE OF CONSULTATION:  2019

 

 

 

TYPE OF CONSULTATION:  Infectious disease.

 

REASON FOR CONSULTATION:  Antibiotic management.

 

HISTORY OF PRESENT ILLNESS:  Quiana Childs is a 23-year-old female,  3, para 2 at 24 weeks gesta
tion.  Her estimated delivery date is 10/01/2019.  She arrived from [facility] and came to the [facility] from the airport.  She reports having urinary tract infection in [facility] for which she w
as receiving IV antibiotics on a daily basis.  She has fever, total body aches, flu-like symptoms and
 back pain.  She also notes that she has multiple mosquito bites.

 

PAST MEDICAL HISTORY:  She is  3, para 2.  She has no other medical history of high blood pres
sure, diabetes or heart disease.  She has no vaginal bleeding or leaking fluid.

 

FAMILY HISTORY:  Noncontributory.

 

SOCIAL HISTORY:  She does not smoke, drink or abuse drugs.

 

ALLERGIES:  NONE TO PENICILLIN, SULFA OR FOODS.

 

MEDICATIONS:  Per chart.

 

REVIEW OF SYSTEMS:  As per HPI.

 

PHYSICAL EXAMINATION:

GENERAL:  The patient is well-developed, well-nourished female, alert, responsive, in no acute distre
ss.

VITAL SIGNS:  Stable.  She is afebrile.

SKIN:  Without generalized rash.

HEENT:  Within normal limits.

NECK:  Supple.

LYMPH NODES:  None palpable.

CHEST:  Decreased breath sounds at the bases.

HEART:  Without murmur or gallop.

ABDOMEN:  Soft.  She is obviously pregnant 24 weeks.  No organosplenomegaly or masses.

EXTREMITIES:  Without cyanosis, clubbing, or edema.

RECTAL AND GENITAL:  Deferred.

NEUROLOGIC:  No focal neurological abnormality.

 

ANCILLARY LABORATORY DATA:  Her white count is 11.8, H and H of 10.9 and 32.7, platelet count 267,000
.  BUN and creatinine 4/0.50, glucose of 94.  An ultrasound was done, which showed a single intrauter
ine gestation and breech lie with positive fetal heartbeat.  No gross anomalies.  Urine culture is gr
owing Enterococcus and Candida albicans.  Blood cultures are negative.  She only has 20,000 to 30,000
 colony-forming units of enterococcus and less than 10,000 colony-forming units of Candida per mL.  S
he is currently on ceftriaxone.  My feeling is that the urine culture is either a contaminant or not 
relevant.  I am going to repeat a urine culture.  Continue her on Rocephin or ceftriaxone.  I will di
ctate my findings to OB/GYN to Dr. Steward and Dr. Arriaza.

 

 

Dictated By: JERROLD DREYER MD JD/NTS

DD:    2019 18:47:45

DT:    2019 19:09:41

Conf#: [id number]

DID#:  [id number]

CC: BRODIE STEWARD MD;*EndCC*

## 2019-06-14 LAB
ADD MAN DIFF?: NO
BASOPHIL #: 0 10^3/UL (ref 0–0.1)
BASOPHILS %: 0.2 % (ref 0–2)
EOSINOPHILS #: 0.2 10^3/UL (ref 0–0.5)
EOSINOPHILS %: 1.4 % (ref 0–7)
HEMATOCRIT: 31.3 % (ref 37–47)
HEMOGLOBIN: 10.6 G/DL (ref 12–16)
IMMATURE GRANS #M: 0.06 10^3/UL (ref 0–0.03)
IMMATURE GRANS % (M): 0.5 % (ref 0–0.43)
LYMPHOCYTES #: 2.8 10^3/UL (ref 0.8–2.9)
LYMPHOCYTES %: 22.9 % (ref 15–51)
MEAN CORPUSCULAR HEMOGLOBIN: 31.3 PG (ref 29–33)
MEAN CORPUSCULAR HGB CONC: 33.9 G/DL (ref 32–37)
MEAN CORPUSCULAR VOLUME: 92.3 FL (ref 82–101)
MEAN PLATELET VOLUME: 9.5 FL (ref 7.4–10.4)
MONOCYTE #: 0.3 10^3/UL (ref 0.3–0.9)
MONOCYTES %: 2.8 % (ref 0–11)
NEUTROPHIL #: 8.8 10^3/UL (ref 1.6–7.5)
NEUTROPHILS %: 72.2 % (ref 39–77)
NUCLEATED RED BLOOD CELLS #: 0 10^3/UL (ref 0–0)
NUCLEATED RED BLOOD CELLS%: 0 /100WBC (ref 0–0)
PLATELET COUNT: 259 10^3/UL (ref 140–415)
RED BLOOD COUNT: 3.39 10^6/UL (ref 4.2–5.4)
RED CELL DISTRIBUTION WIDTH: 12 % (ref 11.5–14.5)
WHITE BLOOD COUNT: 12.2 10^3/UL (ref 4.8–10.8)

## 2019-06-14 RX ADMIN — PYRIDOXINE HYDROCHLORIDE 1 MLS/HR: 100 INJECTION, SOLUTION INTRAMUSCULAR; INTRAVENOUS at 16:30

## 2019-06-14 RX ADMIN — Medication 1 TAB: at 09:09

## 2019-06-14 RX ADMIN — OSELTAMIVIR PHOSPHATE SCH MG: 75 CAPSULE ORAL at 09:15

## 2019-06-14 RX ADMIN — OSELTAMIVIR PHOSPHATE 1 MG: 75 CAPSULE ORAL at 09:15

## 2019-06-14 RX ADMIN — FOLIC ACID SCH MLS/HR: 5 INJECTION, SOLUTION INTRAMUSCULAR; INTRAVENOUS; SUBCUTANEOUS at 00:00

## 2019-06-14 RX ADMIN — CEFTRIAXONE SCH MLS/HR: 1 INJECTION, SOLUTION INTRAVENOUS at 03:00

## 2019-06-14 RX ADMIN — Medication SCH TAB: at 09:09

## 2019-06-14 RX ADMIN — CEFTRIAXONE 1 MLS/HR: 1 INJECTION, SOLUTION INTRAVENOUS at 03:00

## 2019-06-14 RX ADMIN — PYRIDOXINE HYDROCHLORIDE SCH MLS/HR: 100 INJECTION, SOLUTION INTRAMUSCULAR; INTRAVENOUS at 16:30

## 2019-06-14 RX ADMIN — THIAMINE HYDROCHLORIDE 1 MLS/HR: 100 INJECTION, SOLUTION INTRAMUSCULAR; INTRAVENOUS at 03:00

## 2019-06-14 RX ADMIN — THIAMINE HYDROCHLORIDE 1 MLS/HR: 100 INJECTION, SOLUTION INTRAMUSCULAR; INTRAVENOUS at 00:00

## 2019-06-14 RX ADMIN — FOLIC ACID SCH MLS/HR: 5 INJECTION, SOLUTION INTRAMUSCULAR; INTRAVENOUS; SUBCUTANEOUS at 03:00

## 2019-06-14 NOTE — QN
Documentation


Comment


23 y.o   been on rocephin , tamiflu


last  temp 100.5 on yesterday am 


today  WBC  sl higher 


ID  saw the patient











WAYLON AMADOR MD                2019 20:08

## 2019-06-14 NOTE — CONS
Assessment/Plan


Assessment/Plan


Hospital Course (Demo Recall)


Patient is alert had been afebrile denies dysuria hematuria frequency or 


urgency.  She remains on Rocephin.  Physical examination well-developed well-


nourished young woman who is alert in no distress head atraumatic normocephalic 


neck is supple chest rise symmetrical breath sounds clear heart S1-S2 abdomen 


soft bowel sounds present 





Assessment:


1.  Resolving UTI


2.  24 weeks gestation


3.  Obesity





Plan: Patient is doing better, pending repeat urine culture, possible discharge 


home today of antibiotics





Consultation Date/Type/Reason


Admit Date/Time


Jun 11, 2019 at 23:45


Initial Consult Date





Type of Consult


id


Date/Time of Note


DATE: 6/14/19 


TIME: 15:01





Exam/Review of Systems


Exam


Vitals





Vital Signs


  Date      Temp   Pulse  Resp  B/P (MAP)   Pulse Ox  O2         O2 Flow    FiO2


Time                                                  Delivery   Rate


   6/13/19  100.5


     00:00


   6/11/19           110    17      112/68            Room Air


     21:26                            (83)








Intake and Output





6/13/19 6/13/19 6/14/19





1515:00


23:00


07:00





IntakeIntake Total


1000 ml


250 ml





BalanceBalance


1000 ml


250 ml














Results


Result Diagram:  


6/11/19 2207 6/11/19 2207








Medications


Medication





Current Medications


Lactated Ringer's 1,000 ml @  125 mls/hr Q8H IV ;  Start 6/11/19 at 23:59


Prenat Multivit/ [place]/Iron/Folic Ac (Prenatal) 1 tab DAILY PO  Last 


administered on 6/14/19at 09:09; Admin Dose 1 TAB;  Start 6/12/19 at 09:00


Acetaminophen (Tylenol Tab) 650 mg Q4H  PRN PO .PAIN OR TEMP Last administered 


on 6/12/19at 23:21; Admin Dose 650 MG;  Start 6/12/19 at 00:00


Ceftriaxone Sodium 50 ml @  100 mls/hr Q24H IVPB  Last administered on 6/14/19at


03:00; Admin Dose 100 MLS/HR;  Start 6/12/19 at 01:00


Sodium Chloride 1,000 ml @  125 mls/hr Q8H IV  Last administered on 6/14/19at 


03:00; Admin Dose 125 MLS/HR;  Start 6/12/19 at 00:30











ANDREY PICKERING NP            Jun 14, 2019 15:01

## 2019-06-15 RX ADMIN — FOLIC ACID SCH MLS/HR: 5 INJECTION, SOLUTION INTRAMUSCULAR; INTRAVENOUS; SUBCUTANEOUS at 16:11

## 2019-06-15 RX ADMIN — THIAMINE HYDROCHLORIDE 1 MLS/HR: 100 INJECTION, SOLUTION INTRAMUSCULAR; INTRAVENOUS at 23:30

## 2019-06-15 RX ADMIN — THIAMINE HYDROCHLORIDE 1 MLS/HR: 100 INJECTION, SOLUTION INTRAMUSCULAR; INTRAVENOUS at 21:58

## 2019-06-15 RX ADMIN — THIAMINE HYDROCHLORIDE 1 MLS/HR: 100 INJECTION, SOLUTION INTRAMUSCULAR; INTRAVENOUS at 16:11

## 2019-06-15 RX ADMIN — FOLIC ACID SCH MLS/HR: 5 INJECTION, SOLUTION INTRAMUSCULAR; INTRAVENOUS; SUBCUTANEOUS at 00:07

## 2019-06-15 RX ADMIN — CEFTRIAXONE 1 MLS/HR: 1 INJECTION, SOLUTION INTRAVENOUS at 01:05

## 2019-06-15 RX ADMIN — Medication SCH TAB: at 08:50

## 2019-06-15 RX ADMIN — FOLIC ACID SCH MLS/HR: 5 INJECTION, SOLUTION INTRAMUSCULAR; INTRAVENOUS; SUBCUTANEOUS at 21:58

## 2019-06-15 RX ADMIN — CEFTRIAXONE SCH MLS/HR: 1 INJECTION, SOLUTION INTRAVENOUS at 01:05

## 2019-06-15 RX ADMIN — FOLIC ACID SCH MLS/HR: 5 INJECTION, SOLUTION INTRAMUSCULAR; INTRAVENOUS; SUBCUTANEOUS at 00:30

## 2019-06-15 RX ADMIN — THIAMINE HYDROCHLORIDE 1 MLS/HR: 100 INJECTION, SOLUTION INTRAMUSCULAR; INTRAVENOUS at 08:07

## 2019-06-15 RX ADMIN — THIAMINE HYDROCHLORIDE 1 MLS/HR: 100 INJECTION, SOLUTION INTRAMUSCULAR; INTRAVENOUS at 00:30

## 2019-06-15 RX ADMIN — THIAMINE HYDROCHLORIDE 1 MLS/HR: 100 INJECTION, SOLUTION INTRAMUSCULAR; INTRAVENOUS at 00:07

## 2019-06-15 RX ADMIN — Medication 1 TAB: at 08:50

## 2019-06-15 RX ADMIN — FOLIC ACID SCH MLS/HR: 5 INJECTION, SOLUTION INTRAMUSCULAR; INTRAVENOUS; SUBCUTANEOUS at 08:07

## 2019-06-15 RX ADMIN — FOLIC ACID SCH MLS/HR: 5 INJECTION, SOLUTION INTRAMUSCULAR; INTRAVENOUS; SUBCUTANEOUS at 23:30

## 2019-06-15 NOTE — QN
Documentation


Comment


Patient denies any fever or chills.  Denies any leaking of fluid, vaginal 


bleeding or decreased fetal movement.





General appearance: Alert and oriented x4 does not appear to be in any acute 


distress





Abdomen: Soft, gravid, fundal height consider gestational age, no tenderness, no


tenderness, no guarding, no rigidity


There is CVA tenderness in the right side noted still





                          VS - Last 72 Hours, by Label


  Date      Temp   Pulse  Resp  B/P (MAP)  Pulse Ox  O2          O2 Flow    FiO2


Time                                                 Delivery    Rate


   6/13/19  100.5


     00:00


   6/12/19  102.5


     23:21

















Assessment


IUP at 24 weeks and 4 days


Pyelonephritis


Undergoing treatment with Rocephin every 24 hours


Currently Fever improved. T < 100.3


Renal ultrasound normal.  Patient pyelonephritis being followed by ID


Resident of [facility] area, had mosquito bites


Physical labs are pending


Continue same antibiotics


Follow-up with a urine culture


Continue IV hydration


Intermittent monitoring


Follow-up with perinatologist and ID in [place]











RENATO TITUS MD              Juvenal 15, 2019 21:57

## 2019-06-16 LAB
ADD UMIC: NO
UR ASCORBIC ACID: NEGATIVE MG/DL
UR BILIRUBIN (DIP): NEGATIVE MG/DL
UR BLOOD (DIP): NEGATIVE MG/DL
UR CLARITY: CLEAR
UR COLOR: (no result)
UR GLUCOSE (DIP): NEGATIVE MG/DL
UR KETONES (DIP): NEGATIVE MG/DL
UR LEUKOCYTE ESTERASE (DIP): NEGATIVE LEU/UL
UR NITRITE (DIP): NEGATIVE MG/DL
UR PH (DIP): 7 (ref 5–9)
UR SPECIFIC GRAVITY (DIP): 1 (ref 1–1.03)
UR TOTAL PROTEIN (DIP): NEGATIVE MG/DL
UR UROBILINOGEN (DIP): NEGATIVE MG/DL

## 2019-06-16 RX ADMIN — Medication 1 TAB: at 09:11

## 2019-06-16 RX ADMIN — FOLIC ACID SCH MLS/HR: 5 INJECTION, SOLUTION INTRAMUSCULAR; INTRAVENOUS; SUBCUTANEOUS at 04:20

## 2019-06-16 RX ADMIN — Medication SCH TAB: at 09:11

## 2019-06-16 RX ADMIN — THIAMINE HYDROCHLORIDE 1 MLS/HR: 100 INJECTION, SOLUTION INTRAMUSCULAR; INTRAVENOUS at 04:20

## 2019-06-16 RX ADMIN — THIAMINE HYDROCHLORIDE 1 MLS/HR: 100 INJECTION, SOLUTION INTRAMUSCULAR; INTRAVENOUS at 14:19

## 2019-06-16 RX ADMIN — THIAMINE HYDROCHLORIDE 1 MLS/HR: 100 INJECTION, SOLUTION INTRAMUSCULAR; INTRAVENOUS at 09:13

## 2019-06-16 RX ADMIN — CEFTRIAXONE 1 MLS/HR: 1 INJECTION, SOLUTION INTRAVENOUS at 00:00

## 2019-06-16 RX ADMIN — FOLIC ACID SCH MLS/HR: 5 INJECTION, SOLUTION INTRAMUSCULAR; INTRAVENOUS; SUBCUTANEOUS at 14:19

## 2019-06-16 RX ADMIN — FOLIC ACID SCH MLS/HR: 5 INJECTION, SOLUTION INTRAMUSCULAR; INTRAVENOUS; SUBCUTANEOUS at 09:13

## 2019-06-16 RX ADMIN — THIAMINE HYDROCHLORIDE 1 MLS/HR: 100 INJECTION, SOLUTION INTRAMUSCULAR; INTRAVENOUS at 19:30

## 2019-06-16 RX ADMIN — CEFTRIAXONE SCH MLS/HR: 1 INJECTION, SOLUTION INTRAVENOUS at 00:00

## 2019-06-16 RX ADMIN — FOLIC ACID SCH MLS/HR: 5 INJECTION, SOLUTION INTRAMUSCULAR; INTRAVENOUS; SUBCUTANEOUS at 19:30

## 2019-06-16 NOTE — QN
Documentation


Comment


24+wks GA Pyelonephritis on Rocephin smptoms improved


U/u Enterococcus 


NST reassuring for GA


[place] No CTxs


pelvic deferred


--->Continuous antibiotics


--->Clsoe Observation











AMBER SEPINAL M.D.            Jun 16, 2019 10:26

## 2019-06-17 RX ADMIN — CEFTRIAXONE 1 MLS/HR: 1 INJECTION, SOLUTION INTRAVENOUS at 01:05

## 2019-06-17 RX ADMIN — CEFTRIAXONE SCH MLS/HR: 1 INJECTION, SOLUTION INTRAVENOUS at 01:05

## 2019-06-17 RX ADMIN — THIAMINE HYDROCHLORIDE 1 MLS/HR: 100 INJECTION, SOLUTION INTRAMUSCULAR; INTRAVENOUS at 02:20

## 2019-06-17 NOTE — QN
Documentation


Comment


progress note 


patient seen and evaluated


no complaints 


vs stable afebrile


ab gravid, mild left cva tenderness


extremity no edema no calf tenderness





a/ iup at 24 wks ga, pyelonephritis symptoms resolved


stable afebrile





p/discharge home











BRODIE STEWARD MD           Jun 17, 2019 09:48

## 2019-06-18 NOTE — DS
DATE OF ADMISSION: 2019

DATE OF DISCHARGE: 2019

 

PRIMARY DIAGNOSIS:  Intrauterine pregnancy at 24 weeks gestational age with pyelonephritis, undeliver
ed.

 

PROCEDURE:  None.

 

CONDITION ON DISCHARGE:  Stable.

 

DIET:  Regular.

 

MEDICATIONS ON DISCHARGE:  Macrobid 100 mg p.o. b.i.d., #14.

 

ACTIVITIES:  As tolerated.

 

DISCHARGE SUMMARY:  Ms. Quiana Childs was admitted on 2019 secondary to symptomatic pyelonephriti
s.  She was given IV antibiotics.  Currently denies any headache, nausea, vomiting, shortness of sheldon
th, or visual changes.  She will be discharged home today, on 2019, with strict  labor p
recautions and fetal kick count.  The patient was advised to follow up in clinic, especially for the 
Zika virus results secondary to recent visit to [facility].

 

 

Dictated By: BRODIE STONE/IDA

DD:    2019 09:49:48

DT:    2019 08:22:33

Conf#: [id number]

DID#:  [id number]

## 2019-09-17 ENCOUNTER — HOSPITAL ENCOUNTER (INPATIENT)
Dept: HOSPITAL 91 - OBT | Age: 24
LOS: 5 days | Discharge: HOME | End: 2019-09-22
Payer: COMMERCIAL

## 2019-09-17 ENCOUNTER — HOSPITAL ENCOUNTER (INPATIENT)
Dept: HOSPITAL 10 - OBT | Age: 24
LOS: 5 days | Discharge: HOME | End: 2019-09-22
Attending: OBSTETRICS & GYNECOLOGY | Admitting: OBSTETRICS & GYNECOLOGY
Payer: COMMERCIAL

## 2019-09-17 VITALS — HEART RATE: 98 BPM | SYSTOLIC BLOOD PRESSURE: 124 MMHG | DIASTOLIC BLOOD PRESSURE: 77 MMHG | RESPIRATION RATE: 19 BRPM

## 2019-09-17 VITALS — HEIGHT: 60 IN | BODY MASS INDEX: 57.52 KG/M2 | WEIGHT: 293 LBS

## 2019-09-17 DIAGNOSIS — O99.213: ICD-10-CM

## 2019-09-17 DIAGNOSIS — E66.01: ICD-10-CM

## 2019-09-17 DIAGNOSIS — Z3A.38: ICD-10-CM

## 2019-09-17 DIAGNOSIS — O34.211: Primary | ICD-10-CM

## 2019-09-17 PROCEDURE — 87340 HEPATITIS B SURFACE AG IA: CPT

## 2019-09-17 PROCEDURE — 80053 COMPREHEN METABOLIC PANEL: CPT

## 2019-09-17 PROCEDURE — 82575 CREATININE CLEARANCE TEST: CPT

## 2019-09-17 PROCEDURE — 86901 BLOOD TYPING SEROLOGIC RH(D): CPT

## 2019-09-17 PROCEDURE — 86592 SYPHILIS TEST NON-TREP QUAL: CPT

## 2019-09-17 PROCEDURE — 85025 COMPLETE CBC W/AUTO DIFF WBC: CPT

## 2019-09-17 PROCEDURE — 76818 FETAL BIOPHYS PROFILE W/NST: CPT

## 2019-09-17 PROCEDURE — 86850 RBC ANTIBODY SCREEN: CPT

## 2019-09-17 PROCEDURE — 86900 BLOOD TYPING SEROLOGIC ABO: CPT

## 2019-09-17 PROCEDURE — 85610 PROTHROMBIN TIME: CPT

## 2019-09-17 PROCEDURE — 84156 ASSAY OF PROTEIN URINE: CPT

## 2019-09-17 PROCEDURE — 85730 THROMBOPLASTIN TIME PARTIAL: CPT

## 2019-09-17 PROCEDURE — 88302 TISSUE EXAM BY PATHOLOGIST: CPT

## 2019-09-17 PROCEDURE — 81001 URINALYSIS AUTO W/SCOPE: CPT

## 2019-09-17 PROCEDURE — 84560 ASSAY OF URINE/URIC ACID: CPT

## 2019-09-17 PROCEDURE — G0463 HOSPITAL OUTPT CLINIC VISIT: HCPCS

## 2019-09-17 RX ADMIN — PYRIDOXINE HYDROCHLORIDE SCH MLS/HR: 100 INJECTION, SOLUTION INTRAMUSCULAR; INTRAVENOUS at 17:07

## 2019-09-17 RX ADMIN — PYRIDOXINE HYDROCHLORIDE 1 MLS/HR: 100 INJECTION, SOLUTION INTRAMUSCULAR; INTRAVENOUS at 17:07

## 2019-09-18 RX ADMIN — PYRIDOXINE HYDROCHLORIDE 1 MLS/HR: 100 INJECTION, SOLUTION INTRAMUSCULAR; INTRAVENOUS at 19:00

## 2019-09-18 RX ADMIN — PYRIDOXINE HYDROCHLORIDE SCH MLS/HR: 100 INJECTION, SOLUTION INTRAMUSCULAR; INTRAVENOUS at 17:34

## 2019-09-18 RX ADMIN — ACETAMINOPHEN 1 MG: 325 TABLET, FILM COATED ORAL at 05:31

## 2019-09-18 RX ADMIN — PYRIDOXINE HYDROCHLORIDE SCH MLS/HR: 100 INJECTION, SOLUTION INTRAMUSCULAR; INTRAVENOUS at 00:36

## 2019-09-18 RX ADMIN — PYRIDOXINE HYDROCHLORIDE 1 MLS/HR: 100 INJECTION, SOLUTION INTRAMUSCULAR; INTRAVENOUS at 17:34

## 2019-09-18 RX ADMIN — PYRIDOXINE HYDROCHLORIDE 1 MLS/HR: 100 INJECTION, SOLUTION INTRAMUSCULAR; INTRAVENOUS at 00:36

## 2019-09-18 RX ADMIN — Medication 1 TAB: at 08:43

## 2019-09-18 RX ADMIN — PYRIDOXINE HYDROCHLORIDE SCH MLS/HR: 100 INJECTION, SOLUTION INTRAMUSCULAR; INTRAVENOUS at 19:00

## 2019-09-19 VITALS — HEART RATE: 67 BPM | DIASTOLIC BLOOD PRESSURE: 84 MMHG | SYSTOLIC BLOOD PRESSURE: 135 MMHG | RESPIRATION RATE: 17 BRPM

## 2019-09-19 VITALS — DIASTOLIC BLOOD PRESSURE: 77 MMHG | SYSTOLIC BLOOD PRESSURE: 123 MMHG | HEART RATE: 73 BPM | RESPIRATION RATE: 20 BRPM

## 2019-09-19 VITALS — SYSTOLIC BLOOD PRESSURE: 128 MMHG | HEART RATE: 70 BPM | RESPIRATION RATE: 17 BRPM | DIASTOLIC BLOOD PRESSURE: 80 MMHG

## 2019-09-19 VITALS — SYSTOLIC BLOOD PRESSURE: 121 MMHG | RESPIRATION RATE: 18 BRPM | DIASTOLIC BLOOD PRESSURE: 76 MMHG | HEART RATE: 68 BPM

## 2019-09-19 PROCEDURE — 10907ZC DRAINAGE OF AMNIOTIC FLUID, THERAPEUTIC FROM PRODUCTS OF CONCEPTION, VIA NATURAL OR ARTIFICIAL OPENING: ICD-10-PCS

## 2019-09-19 PROCEDURE — 0UT70ZZ RESECTION OF BILATERAL FALLOPIAN TUBES, OPEN APPROACH: ICD-10-PCS | Performed by: OBSTETRICS & GYNECOLOGY

## 2019-09-19 PROCEDURE — 10907ZC DRAINAGE OF AMNIOTIC FLUID, THERAPEUTIC FROM PRODUCTS OF CONCEPTION, VIA NATURAL OR ARTIFICIAL OPENING: ICD-10-PCS | Performed by: OBSTETRICS & GYNECOLOGY

## 2019-09-19 PROCEDURE — 0UT70ZZ RESECTION OF BILATERAL FALLOPIAN TUBES, OPEN APPROACH: ICD-10-PCS

## 2019-09-19 RX ADMIN — DOCUSATE SODIUM AND SENNOSIDES SCH TAB: 8.6; 5 TABLET, FILM COATED ORAL at 20:30

## 2019-09-19 RX ADMIN — PYRIDOXINE HYDROCHLORIDE 1 MLS/HR: 100 INJECTION, SOLUTION INTRAMUSCULAR; INTRAVENOUS at 16:56

## 2019-09-19 RX ADMIN — PYRIDOXINE HYDROCHLORIDE 1 MLS/HR: 100 INJECTION, SOLUTION INTRAMUSCULAR; INTRAVENOUS at 08:31

## 2019-09-19 RX ADMIN — Medication 1 MLS/HR: at 16:56

## 2019-09-19 RX ADMIN — KETOROLAC TROMETHAMINE 1 MG: 30 INJECTION, SOLUTION INTRAMUSCULAR at 20:26

## 2019-09-19 RX ADMIN — PYRIDOXINE HYDROCHLORIDE SCH MLS/HR: 100 INJECTION, SOLUTION INTRAMUSCULAR; INTRAVENOUS at 08:31

## 2019-09-19 RX ADMIN — CEFAZOLIN SODIUM 1 MLS/HR: 2 SOLUTION INTRAVENOUS at 07:17

## 2019-09-19 RX ADMIN — PYRIDOXINE HYDROCHLORIDE 1 MLS/HR: 100 INJECTION, SOLUTION INTRAMUSCULAR; INTRAVENOUS at 16:31

## 2019-09-19 RX ADMIN — PYRIDOXINE HYDROCHLORIDE SCH MLS/HR: 100 INJECTION, SOLUTION INTRAMUSCULAR; INTRAVENOUS at 16:56

## 2019-09-19 RX ADMIN — PYRIDOXINE HYDROCHLORIDE SCH MLS/HR: 100 INJECTION, SOLUTION INTRAMUSCULAR; INTRAVENOUS at 16:31

## 2019-09-19 RX ADMIN — PYRIDOXINE HYDROCHLORIDE 1 MLS/HR: 100 INJECTION, SOLUTION INTRAMUSCULAR; INTRAVENOUS at 03:04

## 2019-09-19 RX ADMIN — PYRIDOXINE HYDROCHLORIDE SCH MLS/HR: 100 INJECTION, SOLUTION INTRAMUSCULAR; INTRAVENOUS at 23:20

## 2019-09-19 RX ADMIN — DOCUSATE SODIUM AND SENNOSIDES 1 TAB: 8.6; 5 TABLET, FILM COATED ORAL at 20:30

## 2019-09-19 RX ADMIN — Medication 1 MLS/HR: at 11:35

## 2019-09-19 RX ADMIN — PYRIDOXINE HYDROCHLORIDE 1 MLS/HR: 100 INJECTION, SOLUTION INTRAMUSCULAR; INTRAVENOUS at 23:20

## 2019-09-19 RX ADMIN — PYRIDOXINE HYDROCHLORIDE SCH MLS/HR: 100 INJECTION, SOLUTION INTRAMUSCULAR; INTRAVENOUS at 03:04

## 2019-09-19 RX ADMIN — KETOROLAC TROMETHAMINE PRN MG: 30 INJECTION, SOLUTION INTRAMUSCULAR at 20:26

## 2019-09-20 VITALS — RESPIRATION RATE: 16 BRPM | SYSTOLIC BLOOD PRESSURE: 136 MMHG | HEART RATE: 88 BPM | DIASTOLIC BLOOD PRESSURE: 90 MMHG

## 2019-09-20 VITALS — DIASTOLIC BLOOD PRESSURE: 87 MMHG | SYSTOLIC BLOOD PRESSURE: 115 MMHG | HEART RATE: 60 BPM | RESPIRATION RATE: 18 BRPM

## 2019-09-20 VITALS — DIASTOLIC BLOOD PRESSURE: 64 MMHG | HEART RATE: 63 BPM | SYSTOLIC BLOOD PRESSURE: 121 MMHG | RESPIRATION RATE: 20 BRPM

## 2019-09-20 VITALS — HEART RATE: 76 BPM | DIASTOLIC BLOOD PRESSURE: 73 MMHG | SYSTOLIC BLOOD PRESSURE: 108 MMHG | RESPIRATION RATE: 20 BRPM

## 2019-09-20 RX ADMIN — DOCUSATE SODIUM AND SENNOSIDES SCH TAB: 8.6; 5 TABLET, FILM COATED ORAL at 09:23

## 2019-09-20 RX ADMIN — FERROUS SULFATE TAB 325 MG (65 MG ELEMENTAL FE) SCH MG: 325 (65 FE) TAB at 21:30

## 2019-09-20 RX ADMIN — FERROUS SULFATE TAB 325 MG (65 MG ELEMENTAL FE) 1 MG: 325 (65 FE) TAB at 21:30

## 2019-09-20 RX ADMIN — PYRIDOXINE HYDROCHLORIDE 1 MLS/HR: 100 INJECTION, SOLUTION INTRAMUSCULAR; INTRAVENOUS at 08:56

## 2019-09-20 RX ADMIN — IBUPROFEN 1 MG: 800 TABLET, FILM COATED ORAL at 16:20

## 2019-09-20 RX ADMIN — DOCUSATE SODIUM AND SENNOSIDES 1 TAB: 8.6; 5 TABLET, FILM COATED ORAL at 09:23

## 2019-09-20 RX ADMIN — DOCUSATE SODIUM AND SENNOSIDES SCH TAB: 8.6; 5 TABLET, FILM COATED ORAL at 21:30

## 2019-09-20 RX ADMIN — PYRIDOXINE HYDROCHLORIDE SCH MLS/HR: 100 INJECTION, SOLUTION INTRAMUSCULAR; INTRAVENOUS at 08:56

## 2019-09-20 RX ADMIN — KETOROLAC TROMETHAMINE 1 MG: 30 INJECTION, SOLUTION INTRAMUSCULAR at 04:03

## 2019-09-20 RX ADMIN — DOCUSATE SODIUM AND SENNOSIDES 1 TAB: 8.6; 5 TABLET, FILM COATED ORAL at 21:30

## 2019-09-20 RX ADMIN — IBUPROFEN PRN MG: 800 TABLET ORAL at 16:20

## 2019-09-20 RX ADMIN — Medication 1 MG: at 16:20

## 2019-09-20 RX ADMIN — KETOROLAC TROMETHAMINE PRN MG: 30 INJECTION, SOLUTION INTRAMUSCULAR at 04:03

## 2019-09-20 RX ADMIN — OXYCODONE HYDROCHLORIDE AND ACETAMINOPHEN 1 TAB: 5; 325 TABLET ORAL at 21:30

## 2019-09-20 RX ADMIN — OXYCODONE HYDROCHLORIDE AND ACETAMINOPHEN 1 TAB: 5; 325 TABLET ORAL at 11:30

## 2019-09-21 VITALS — DIASTOLIC BLOOD PRESSURE: 82 MMHG | SYSTOLIC BLOOD PRESSURE: 127 MMHG | RESPIRATION RATE: 18 BRPM | HEART RATE: 70 BPM

## 2019-09-21 VITALS — DIASTOLIC BLOOD PRESSURE: 85 MMHG | RESPIRATION RATE: 18 BRPM | SYSTOLIC BLOOD PRESSURE: 121 MMHG | HEART RATE: 80 BPM

## 2019-09-21 VITALS — RESPIRATION RATE: 18 BRPM | HEART RATE: 59 BPM | SYSTOLIC BLOOD PRESSURE: 130 MMHG | DIASTOLIC BLOOD PRESSURE: 88 MMHG

## 2019-09-21 VITALS — HEART RATE: 67 BPM | DIASTOLIC BLOOD PRESSURE: 64 MMHG | SYSTOLIC BLOOD PRESSURE: 116 MMHG | RESPIRATION RATE: 20 BRPM

## 2019-09-21 RX ADMIN — FERROUS SULFATE TAB 325 MG (65 MG ELEMENTAL FE) 1 MG: 325 (65 FE) TAB at 22:23

## 2019-09-21 RX ADMIN — IBUPROFEN 1 MG: 800 TABLET, FILM COATED ORAL at 16:37

## 2019-09-21 RX ADMIN — OXYCODONE HYDROCHLORIDE AND ACETAMINOPHEN 1 TAB: 5; 325 TABLET ORAL at 04:25

## 2019-09-21 RX ADMIN — IBUPROFEN 1 MG: 800 TABLET, FILM COATED ORAL at 00:49

## 2019-09-21 RX ADMIN — DOCUSATE SODIUM AND SENNOSIDES SCH TAB: 8.6; 5 TABLET, FILM COATED ORAL at 08:11

## 2019-09-21 RX ADMIN — FERROUS SULFATE TAB 325 MG (65 MG ELEMENTAL FE) SCH MG: 325 (65 FE) TAB at 22:23

## 2019-09-21 RX ADMIN — FERROUS SULFATE TAB 325 MG (65 MG ELEMENTAL FE) 1 MG: 325 (65 FE) TAB at 10:06

## 2019-09-21 RX ADMIN — DOCUSATE SODIUM AND SENNOSIDES 1 TAB: 8.6; 5 TABLET, FILM COATED ORAL at 22:23

## 2019-09-21 RX ADMIN — OXYCODONE HYDROCHLORIDE AND ACETAMINOPHEN 1 TAB: 5; 325 TABLET ORAL at 22:23

## 2019-09-21 RX ADMIN — DOCUSATE SODIUM AND SENNOSIDES 1 TAB: 8.6; 5 TABLET, FILM COATED ORAL at 08:11

## 2019-09-21 RX ADMIN — OXYCODONE HYDROCHLORIDE AND ACETAMINOPHEN 1 TAB: 5; 325 TABLET ORAL at 11:31

## 2019-09-21 RX ADMIN — DOCUSATE SODIUM AND SENNOSIDES SCH TAB: 8.6; 5 TABLET, FILM COATED ORAL at 22:23

## 2019-09-21 RX ADMIN — IBUPROFEN PRN MG: 800 TABLET ORAL at 00:49

## 2019-09-21 RX ADMIN — FERROUS SULFATE TAB 325 MG (65 MG ELEMENTAL FE) SCH MG: 325 (65 FE) TAB at 10:06

## 2019-09-21 RX ADMIN — IBUPROFEN PRN MG: 800 TABLET ORAL at 16:37

## 2019-09-22 VITALS — HEART RATE: 89 BPM | SYSTOLIC BLOOD PRESSURE: 128 MMHG | RESPIRATION RATE: 16 BRPM | DIASTOLIC BLOOD PRESSURE: 81 MMHG

## 2019-09-22 VITALS — DIASTOLIC BLOOD PRESSURE: 89 MMHG | RESPIRATION RATE: 19 BRPM | HEART RATE: 71 BPM | SYSTOLIC BLOOD PRESSURE: 136 MMHG

## 2019-09-22 RX ADMIN — Medication 1 APPLIC: at 11:31

## 2019-09-22 RX ADMIN — DOCUSATE SODIUM AND SENNOSIDES SCH TAB: 8.6; 5 TABLET, FILM COATED ORAL at 08:16

## 2019-09-22 RX ADMIN — FERROUS SULFATE TAB 325 MG (65 MG ELEMENTAL FE) 1 MG: 325 (65 FE) TAB at 08:16

## 2019-09-22 RX ADMIN — FERROUS SULFATE TAB 325 MG (65 MG ELEMENTAL FE) SCH MG: 325 (65 FE) TAB at 08:16

## 2019-09-22 RX ADMIN — DOCUSATE SODIUM AND SENNOSIDES 1 TAB: 8.6; 5 TABLET, FILM COATED ORAL at 08:16

## 2019-09-22 RX ADMIN — SODIUM PHOSPHATE, DIBASIC AND SODIUM PHOSPHATE, MONOBASIC 1 ML: 7; 19 ENEMA RECTAL at 08:19

## 2019-09-22 RX ADMIN — OXYCODONE HYDROCHLORIDE AND ACETAMINOPHEN 1 TAB: 5; 325 TABLET ORAL at 11:31
